# Patient Record
Sex: MALE | Race: WHITE | URBAN - METROPOLITAN AREA
[De-identification: names, ages, dates, MRNs, and addresses within clinical notes are randomized per-mention and may not be internally consistent; named-entity substitution may affect disease eponyms.]

---

## 2020-05-13 PROBLEM — M79.89 SWELLING OF RIGHT HAND: Status: ACTIVE | Noted: 2020-05-13

## 2023-01-14 ENCOUNTER — APPOINTMENT (OUTPATIENT)
Dept: GENERAL RADIOLOGY | Age: 68
End: 2023-01-14
Attending: EMERGENCY MEDICINE
Payer: COMMERCIAL

## 2023-01-14 ENCOUNTER — HOSPITAL ENCOUNTER (INPATIENT)
Facility: HOSPITAL | Age: 68
LOS: 1 days | Discharge: HOME | End: 2023-01-15
Attending: EMERGENCY MEDICINE | Admitting: HOSPITALIST
Payer: COMMERCIAL

## 2023-01-14 ENCOUNTER — HOSPITAL ENCOUNTER (OUTPATIENT)
Facility: HOSPITAL | Age: 68
Setting detail: OBSERVATION
Discharge: HOME | End: 2023-01-15
Attending: EMERGENCY MEDICINE | Admitting: HOSPITALIST
Payer: COMMERCIAL

## 2023-01-14 ENCOUNTER — APPOINTMENT (OUTPATIENT)
Dept: ULTRASOUND IMAGING | Age: 68
End: 2023-01-14
Attending: EMERGENCY MEDICINE
Payer: COMMERCIAL

## 2023-01-14 DIAGNOSIS — I82.412 ACUTE DEEP VEIN THROMBOSIS (DVT) OF FEMORAL VEIN OF LEFT LOWER EXTREMITY (HCC): Primary | ICD-10-CM

## 2023-01-14 LAB
ALBUMIN SERPL-MCNC: 3.7 G/DL (ref 3.4–5)
ALBUMIN/GLOB SERPL: 1.1 {RATIO} (ref 1–2)
ALP LIVER SERPL-CCNC: 102 U/L
ALT SERPL-CCNC: 23 U/L
ANION GAP SERPL CALC-SCNC: 4 MMOL/L (ref 0–18)
APTT PPP: 25.1 SECONDS (ref 23.3–35.6)
AST SERPL-CCNC: 19 U/L (ref 15–37)
BASOPHILS # BLD AUTO: 0.04 X10(3) UL (ref 0–0.2)
BASOPHILS NFR BLD AUTO: 0.5 %
BILIRUB SERPL-MCNC: 0.7 MG/DL (ref 0.1–2)
BUN BLD-MCNC: 15 MG/DL (ref 7–18)
CALCIUM BLD-MCNC: 9.1 MG/DL (ref 8.5–10.1)
CHLORIDE SERPL-SCNC: 104 MMOL/L (ref 98–112)
CO2 SERPL-SCNC: 29 MMOL/L (ref 21–32)
CREAT BLD-MCNC: 0.86 MG/DL
EOSINOPHIL # BLD AUTO: 0.05 X10(3) UL (ref 0–0.7)
EOSINOPHIL NFR BLD AUTO: 0.6 %
ERYTHROCYTE [DISTWIDTH] IN BLOOD BY AUTOMATED COUNT: 11.3 %
GFR SERPLBLD BASED ON 1.73 SQ M-ARVRAT: 95 ML/MIN/1.73M2 (ref 60–?)
GLOBULIN PLAS-MCNC: 3.4 G/DL (ref 2.8–4.4)
GLUCOSE BLD-MCNC: 125 MG/DL (ref 70–99)
HCT VFR BLD AUTO: 42.3 %
HGB BLD-MCNC: 15.4 G/DL
IMM GRANULOCYTES # BLD AUTO: 0.03 X10(3) UL (ref 0–1)
IMM GRANULOCYTES NFR BLD: 0.4 %
INR BLD: 1.02 (ref 0.85–1.16)
LYMPHOCYTES # BLD AUTO: 1.15 X10(3) UL (ref 1–4)
LYMPHOCYTES NFR BLD AUTO: 13.5 %
MCH RBC QN AUTO: 34 PG (ref 26–34)
MCHC RBC AUTO-ENTMCNC: 36.4 G/DL (ref 31–37)
MCV RBC AUTO: 93.4 FL
MONOCYTES # BLD AUTO: 1.14 X10(3) UL (ref 0.1–1)
MONOCYTES NFR BLD AUTO: 13.3 %
NEUTROPHILS # BLD AUTO: 6.14 X10 (3) UL (ref 1.5–7.7)
NEUTROPHILS # BLD AUTO: 6.14 X10(3) UL (ref 1.5–7.7)
NEUTROPHILS NFR BLD AUTO: 71.7 %
NT-PROBNP SERPL-MCNC: 86 PG/ML (ref ?–125)
OSMOLALITY SERPL CALC.SUM OF ELEC: 286 MOSM/KG (ref 275–295)
PLATELET # BLD AUTO: 182 10(3)UL (ref 150–450)
POTASSIUM SERPL-SCNC: 3.9 MMOL/L (ref 3.5–5.1)
PROT SERPL-MCNC: 7.1 G/DL (ref 6.4–8.2)
PROTHROMBIN TIME: 13.4 SECONDS (ref 11.6–14.8)
RBC # BLD AUTO: 4.53 X10(6)UL
SARS-COV-2 RNA RESP QL NAA+PROBE: NOT DETECTED
SODIUM SERPL-SCNC: 137 MMOL/L (ref 136–145)
WBC # BLD AUTO: 8.6 X10(3) UL (ref 4–11)

## 2023-01-14 PROCEDURE — 99223 1ST HOSP IP/OBS HIGH 75: CPT | Performed by: INTERNAL MEDICINE

## 2023-01-14 PROCEDURE — 71046 X-RAY EXAM CHEST 2 VIEWS: CPT | Performed by: EMERGENCY MEDICINE

## 2023-01-14 PROCEDURE — 93971 EXTREMITY STUDY: CPT | Performed by: EMERGENCY MEDICINE

## 2023-01-14 RX ORDER — METOCLOPRAMIDE HYDROCHLORIDE 5 MG/ML
10 INJECTION INTRAMUSCULAR; INTRAVENOUS EVERY 8 HOURS PRN
Status: DISCONTINUED | OUTPATIENT
Start: 2023-01-14 | End: 2023-01-15

## 2023-01-14 RX ORDER — HEPARIN SODIUM 1000 [USP'U]/ML
80 INJECTION, SOLUTION INTRAVENOUS; SUBCUTANEOUS ONCE
Status: COMPLETED | OUTPATIENT
Start: 2023-01-14 | End: 2023-01-14

## 2023-01-14 RX ORDER — SODIUM PHOSPHATE, DIBASIC AND SODIUM PHOSPHATE, MONOBASIC 7; 19 G/133ML; G/133ML
1 ENEMA RECTAL ONCE AS NEEDED
Status: DISCONTINUED | OUTPATIENT
Start: 2023-01-14 | End: 2023-01-15

## 2023-01-14 RX ORDER — BISACODYL 10 MG
10 SUPPOSITORY, RECTAL RECTAL
Status: DISCONTINUED | OUTPATIENT
Start: 2023-01-14 | End: 2023-01-15

## 2023-01-14 RX ORDER — SENNOSIDES 8.6 MG
17.2 TABLET ORAL NIGHTLY PRN
Status: DISCONTINUED | OUTPATIENT
Start: 2023-01-14 | End: 2023-01-15

## 2023-01-14 RX ORDER — HEPARIN SODIUM AND DEXTROSE 10000; 5 [USP'U]/100ML; G/100ML
INJECTION INTRAVENOUS CONTINUOUS
Status: DISCONTINUED | OUTPATIENT
Start: 2023-01-15 | End: 2023-01-15

## 2023-01-14 RX ORDER — ACETAMINOPHEN 500 MG
500 TABLET ORAL EVERY 4 HOURS PRN
Status: DISCONTINUED | OUTPATIENT
Start: 2023-01-14 | End: 2023-01-15

## 2023-01-14 RX ORDER — ONDANSETRON 2 MG/ML
4 INJECTION INTRAMUSCULAR; INTRAVENOUS EVERY 6 HOURS PRN
Status: DISCONTINUED | OUTPATIENT
Start: 2023-01-14 | End: 2023-01-15

## 2023-01-14 RX ORDER — POLYETHYLENE GLYCOL 3350 17 G/17G
17 POWDER, FOR SOLUTION ORAL DAILY PRN
Status: DISCONTINUED | OUTPATIENT
Start: 2023-01-14 | End: 2023-01-15

## 2023-01-14 RX ORDER — HEPARIN SODIUM AND DEXTROSE 10000; 5 [USP'U]/100ML; G/100ML
18 INJECTION INTRAVENOUS ONCE
Status: COMPLETED | OUTPATIENT
Start: 2023-01-14 | End: 2023-01-15

## 2023-01-14 RX ORDER — MELATONIN
3 NIGHTLY PRN
Status: DISCONTINUED | OUTPATIENT
Start: 2023-01-14 | End: 2023-01-15

## 2023-01-15 ENCOUNTER — APPOINTMENT (OUTPATIENT)
Dept: ULTRASOUND IMAGING | Facility: HOSPITAL | Age: 68
End: 2023-01-15
Attending: INTERNAL MEDICINE
Payer: COMMERCIAL

## 2023-01-15 VITALS
BODY MASS INDEX: 36.58 KG/M2 | SYSTOLIC BLOOD PRESSURE: 132 MMHG | OXYGEN SATURATION: 98 % | TEMPERATURE: 98 F | RESPIRATION RATE: 19 BRPM | HEIGHT: 73 IN | DIASTOLIC BLOOD PRESSURE: 79 MMHG | WEIGHT: 276 LBS | HEART RATE: 68 BPM

## 2023-01-15 LAB
ANION GAP SERPL CALC-SCNC: 5 MMOL/L (ref 0–18)
APTT PPP: 72.9 SECONDS (ref 23.3–35.6)
ATRIAL RATE: 85 BPM
BUN BLD-MCNC: 14 MG/DL (ref 7–18)
CALCIUM BLD-MCNC: 8.5 MG/DL (ref 8.5–10.1)
CHLORIDE SERPL-SCNC: 104 MMOL/L (ref 98–112)
CO2 SERPL-SCNC: 29 MMOL/L (ref 21–32)
CREAT BLD-MCNC: 0.81 MG/DL
ERYTHROCYTE [DISTWIDTH] IN BLOOD BY AUTOMATED COUNT: 11.2 %
GFR SERPLBLD BASED ON 1.73 SQ M-ARVRAT: 97 ML/MIN/1.73M2 (ref 60–?)
GLUCOSE BLD-MCNC: 126 MG/DL (ref 70–99)
HCT VFR BLD AUTO: 39.9 %
HGB BLD-MCNC: 14.1 G/DL
MCH RBC QN AUTO: 34.1 PG (ref 26–34)
MCHC RBC AUTO-ENTMCNC: 35.3 G/DL (ref 31–37)
MCV RBC AUTO: 96.6 FL
OSMOLALITY SERPL CALC.SUM OF ELEC: 288 MOSM/KG (ref 275–295)
P AXIS: 60 DEGREES
P-R INTERVAL: 180 MS
PLATELET # BLD AUTO: 170 10(3)UL (ref 150–450)
POTASSIUM SERPL-SCNC: 4.2 MMOL/L (ref 3.5–5.1)
Q-T INTERVAL: 364 MS
QRS DURATION: 90 MS
QTC CALCULATION (BEZET): 433 MS
R AXIS: 49 DEGREES
RBC # BLD AUTO: 4.13 X10(6)UL
SODIUM SERPL-SCNC: 138 MMOL/L (ref 136–145)
T AXIS: 58 DEGREES
VENTRICULAR RATE: 85 BPM
WBC # BLD AUTO: 6.8 X10(3) UL (ref 4–11)

## 2023-01-15 PROCEDURE — 99239 HOSP IP/OBS DSCHRG MGMT >30: CPT | Performed by: HOSPITALIST

## 2023-01-15 PROCEDURE — 93971 EXTREMITY STUDY: CPT | Performed by: INTERNAL MEDICINE

## 2023-01-15 NOTE — ED QUICK NOTES
Orders for admission, patient is aware of plan and ready to go upstairs. Any questions, please call ED RN Tesha Madrid at extension [phone]. Patient Covid vaccination status: Fully vaccinated     COVID Test Ordered in ED: Rapid SARS-CoV-2 by PCR    COVID Suspicion at Admission: N/A    Running Infusions:      Mental Status/LOC at time of transport: Alert and Oriented x 3    Other pertinent information:   CIWA score: N/A   NIH score:  N/A     Patient has a heparin drip infusion at 2,200 units/hr at 22ml/hr.

## 2023-01-15 NOTE — CM/SW NOTE
Informed by RN that patient will be starting Eliquis at discharge. SW provided RN with 30 day free coupon and $10 month coupon to give to patient. SW will remain available.     MAMADOU Kong  Discharge Planner

## 2023-01-15 NOTE — PLAN OF CARE
Assumed pt care at 0730. A&Ox4. VSS. Room air. NSR on tele. LLE with edema, heat, & redness - pt reports improving. Reports tingling in bilateral feet, US doppler RLE this AM. L AC PIV infusing heparin gtt @ 22 mL/hr. PTT therapeutic per DVT/PE protocol, redraw ordered for 1/16 AM. Denies pain, denies N/V. Cardiac diet. Voiding, up ad rafael to bathroom. Pt updated with POC.      Problem: Patient/Family Goals  Goal: Patient/Family Long Term Goal  Description: Patient's Long Term Goal: discharge  Interventions:  - heparin gtt  - US RLE  - See additional Care Plan goals for specific interventions  Outcome: Progressing  Goal: Patient/Family Short Term Goal  Description: Patient's Short Term Goal: reduce swelling & tingling to legs  Interventions:   - heparin gtt  - US RLE  - See additional Care Plan goals for specific interventions  Outcome: Progressing

## 2023-01-15 NOTE — PROGRESS NOTES
Admitted into unit form [facility] LLE DVT  A & O x 4  On R/A  NSR on tele. Denies Pain. Redness and swelling to LLE  On heparin gtt at 22 units/hr  APTT at 0445  Able to ambulate to the bathroom independently. Oriented to room and call light. Needs met. Will continue to monitor.

## 2023-01-15 NOTE — PLAN OF CARE
NURSING DISCHARGE NOTE    Discharged Home via Ambulatory. Accompanied by Family member  Belongings Taken by patient/family. Pt discharged in calm, stable status to home. PIV removed. Discharge paperwork provided & discussed, pt verbalized understanding. Paper prescription for eliquis provided. 2 coupons provided to pt for eliquis, instructed on use. Pt's primary [facility] is out of eliquis - [facility] that [facility] sufficient stock of eliquis to fill pt's prescription. Pharmacy information provided to pt.

## 2023-01-15 NOTE — ED QUICK NOTES
Patient updated with plan of care. Patient denies any questions or concerns. Awaiting room assignment.

## 2023-01-16 ENCOUNTER — PATIENT OUTREACH (OUTPATIENT)
Dept: CASE MANAGEMENT | Age: 68
End: 2023-01-16

## 2023-01-17 NOTE — PROGRESS NOTES
LM for pt to call [facility] for TCM since discharge. NCM phone number was provided for pt to call back. TCC contact information was provided for pt to call back as well.

## 2023-02-15 ENCOUNTER — TELEPHONE (OUTPATIENT)
Dept: INTERNAL MEDICINE CLINIC | Facility: CLINIC | Age: 68
End: 2023-02-15

## 2023-02-15 NOTE — TELEPHONE ENCOUNTER
Received call from Wicho Grubbs NP, pt called Dr Jordan Prado needing refill for eliquis but has not established care with PCP. Called pt and informed him that Dr Jordan Prado sent in a 2 week supply but pt will need to establish with PCP for further refills. Pt lives in [facility] [facility] and works on the [employer], states he has a 2 hour commute and is very difficult to get in to see providers. Scheduled appt with Dr Burton Edmonds in [place] for Saturday March 11 which pt was very grateful for. Advised pt the eliquis refill will not last until then and he needs to be seen here at the [facility].  Pt stated he would check his schedule and call us back to schedule with the TCC

## 2023-02-21 ENCOUNTER — OFFICE VISIT (OUTPATIENT)
Dept: FAMILY MEDICINE CLINIC | Facility: CLINIC | Age: 68
End: 2023-02-21
Payer: COMMERCIAL

## 2023-02-21 ENCOUNTER — TELEPHONE (OUTPATIENT)
Dept: FAMILY MEDICINE CLINIC | Facility: CLINIC | Age: 68
End: 2023-02-21

## 2023-02-21 VITALS
WEIGHT: 286.38 LBS | SYSTOLIC BLOOD PRESSURE: 130 MMHG | RESPIRATION RATE: 18 BRPM | HEART RATE: 74 BPM | HEIGHT: 73 IN | OXYGEN SATURATION: 100 % | DIASTOLIC BLOOD PRESSURE: 64 MMHG | BODY MASS INDEX: 37.95 KG/M2 | TEMPERATURE: 97 F

## 2023-02-21 DIAGNOSIS — I82.412 ACUTE DEEP VEIN THROMBOSIS (DVT) OF FEMORAL VEIN OF LEFT LOWER EXTREMITY (HCC): Primary | ICD-10-CM

## 2023-02-21 PROCEDURE — 3078F DIAST BP <80 MM HG: CPT | Performed by: FAMILY MEDICINE

## 2023-02-21 PROCEDURE — 3008F BODY MASS INDEX DOCD: CPT | Performed by: FAMILY MEDICINE

## 2023-02-21 PROCEDURE — 1111F DSCHRG MED/CURRENT MED MERGE: CPT | Performed by: FAMILY MEDICINE

## 2023-02-21 PROCEDURE — 3075F SYST BP GE 130 - 139MM HG: CPT | Performed by: FAMILY MEDICINE

## 2023-02-21 PROCEDURE — 99204 OFFICE O/P NEW MOD 45 MIN: CPT | Performed by: FAMILY MEDICINE

## 2023-02-21 NOTE — PATIENT INSTRUCTIONS
Continue your Eliquis 5 mg one tablet twice daily for another 2 months. Elevate your leg. Make an appointment with the hematologist, Dr Nirmala Berry or one of his partners, for recheck on your blood clot. Go to the ER if you develop chest pain, shortness of breath, dizziness, fever, chills, worsening redness or swelling to the left leg. See me in 4 weeks for your annual physical and recheck on your blood clot.

## 2023-02-21 NOTE — TELEPHONE ENCOUNTER
Approved    Prior authorization approved Case ID: [id number]      Payer:  [address]   [phone]  Morrison Nahomy    HDISIH:[id number];CGBKBY:[id number]; Review Type:Prior Auth; Coverage Start Date:01/22/2023; Coverage End Date:02/21/2024;    Approval Details    Authorized from January 22, 2023 to February 21, 2024      Electronic appeal:  Not supported

## 2023-02-22 ENCOUNTER — TELEPHONE (OUTPATIENT)
Dept: FAMILY MEDICINE CLINIC | Facility: CLINIC | Age: 68
End: 2023-02-22

## 2023-02-22 NOTE — TELEPHONE ENCOUNTER
Patient calling states seen yesterday and 2 rx's were sent to his pharmacy still waiting for approval     Patient states has been out of these medications

## 2023-03-03 ENCOUNTER — TELEPHONE (OUTPATIENT)
Dept: HEMATOLOGY/ONCOLOGY | Facility: HOSPITAL | Age: 68
End: 2023-03-03

## 2023-03-03 NOTE — TELEPHONE ENCOUNTER
Patient calling to schedule consult with Dr Marvin Case    Referring Provider: Bryan Chi Referred To Provider: Nick Rivera   Diagnosis: I82.412 (ICD-10-CM) - 453.41 (ICD-9-CM) - Acute deep vein thrombosis (DVT) of femoral vein of left lower extremity [facility])   referral is authorized    plz call his WORK NUMBER    [phone]

## 2023-03-16 ENCOUNTER — OFFICE VISIT (OUTPATIENT)
Dept: HEMATOLOGY/ONCOLOGY | Age: 68
End: 2023-03-16
Attending: INTERNAL MEDICINE
Payer: COMMERCIAL

## 2023-03-16 VITALS
TEMPERATURE: 97 F | OXYGEN SATURATION: 96 % | SYSTOLIC BLOOD PRESSURE: 190 MMHG | HEART RATE: 83 BPM | DIASTOLIC BLOOD PRESSURE: 89 MMHG | RESPIRATION RATE: 20 BRPM | BODY MASS INDEX: 38 KG/M2 | WEIGHT: 286.5 LBS

## 2023-03-16 DIAGNOSIS — I82.412 ACUTE DEEP VEIN THROMBOSIS (DVT) OF FEMORAL VEIN OF LEFT LOWER EXTREMITY (HCC): Primary | ICD-10-CM

## 2023-03-16 DIAGNOSIS — M25.561 CHRONIC PAIN OF BOTH KNEES: ICD-10-CM

## 2023-03-16 DIAGNOSIS — Z79.01 CURRENT USE OF LONG TERM ANTICOAGULATION: ICD-10-CM

## 2023-03-16 DIAGNOSIS — G89.29 CHRONIC PAIN OF BOTH KNEES: ICD-10-CM

## 2023-03-16 DIAGNOSIS — M25.562 CHRONIC PAIN OF BOTH KNEES: ICD-10-CM

## 2023-03-16 PROCEDURE — 99244 OFF/OP CNSLTJ NEW/EST MOD 40: CPT | Performed by: INTERNAL MEDICINE

## 2023-03-16 NOTE — PROGRESS NOTES
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 Less swelling, but not normal yet. Taking eliquis, is affordable for him.

## 2023-03-20 ENCOUNTER — OFFICE VISIT (OUTPATIENT)
Dept: FAMILY MEDICINE CLINIC | Facility: CLINIC | Age: 68
End: 2023-03-20
Payer: COMMERCIAL

## 2023-03-20 VITALS
WEIGHT: 284 LBS | RESPIRATION RATE: 18 BRPM | HEART RATE: 86 BPM | SYSTOLIC BLOOD PRESSURE: 160 MMHG | OXYGEN SATURATION: 98 % | TEMPERATURE: 97 F | DIASTOLIC BLOOD PRESSURE: 84 MMHG | HEIGHT: 73 IN | BODY MASS INDEX: 37.64 KG/M2

## 2023-03-20 DIAGNOSIS — Z28.21 IMMUNIZATION DECLINED: ICD-10-CM

## 2023-03-20 DIAGNOSIS — Z12.5 SCREENING FOR PROSTATE CANCER: ICD-10-CM

## 2023-03-20 DIAGNOSIS — Z12.11 SCREENING FOR COLON CANCER: ICD-10-CM

## 2023-03-20 DIAGNOSIS — I10 PRIMARY HYPERTENSION: ICD-10-CM

## 2023-03-20 DIAGNOSIS — R73.9 HYPERGLYCEMIA: ICD-10-CM

## 2023-03-20 DIAGNOSIS — E66.01 CLASS 2 SEVERE OBESITY DUE TO EXCESS CALORIES WITH SERIOUS COMORBIDITY AND BODY MASS INDEX (BMI) OF 37.0 TO 37.9 IN ADULT (HCC): ICD-10-CM

## 2023-03-20 DIAGNOSIS — Z00.00 WELLNESS EXAMINATION: Primary | ICD-10-CM

## 2023-03-20 DIAGNOSIS — I82.412 ACUTE DEEP VEIN THROMBOSIS (DVT) OF FEMORAL VEIN OF LEFT LOWER EXTREMITY (HCC): ICD-10-CM

## 2023-03-20 DIAGNOSIS — Z00.00 LABORATORY EXAM ORDERED AS PART OF ROUTINE GENERAL MEDICAL EXAMINATION: ICD-10-CM

## 2023-04-09 ENCOUNTER — APPOINTMENT (OUTPATIENT)
Dept: LAB | Age: 68
End: 2023-04-09
Attending: FAMILY MEDICINE
Payer: COMMERCIAL

## 2023-04-13 ENCOUNTER — TELEPHONE (OUTPATIENT)
Dept: HEMATOLOGY/ONCOLOGY | Age: 68
End: 2023-04-13

## 2023-04-13 ENCOUNTER — APPOINTMENT (OUTPATIENT)
Dept: HEMATOLOGY/ONCOLOGY | Age: 68
End: 2023-04-13
Attending: INTERNAL MEDICINE
Payer: COMMERCIAL

## 2023-05-02 ENCOUNTER — HOSPITAL ENCOUNTER (OUTPATIENT)
Dept: ULTRASOUND IMAGING | Age: 68
Discharge: HOME | End: 2023-05-02
Attending: INTERNAL MEDICINE
Payer: COMMERCIAL

## 2023-05-02 DIAGNOSIS — I82.412 ACUTE DEEP VEIN THROMBOSIS (DVT) OF FEMORAL VEIN OF LEFT LOWER EXTREMITY (HCC): ICD-10-CM

## 2023-05-02 PROCEDURE — 93971 EXTREMITY STUDY: CPT | Performed by: INTERNAL MEDICINE

## 2023-05-03 ENCOUNTER — TELEPHONE (OUTPATIENT)
Dept: HEMATOLOGY/ONCOLOGY | Age: 68
End: 2023-05-03

## 2023-05-03 NOTE — TELEPHONE ENCOUNTER
----- Message from Neha Bolanos MD sent at 5/3/2023  2:18 PM CDT -----  Regarding: follow up appt  Can you please contact him to schedule a follow up tomorrow at ? He canceled his last appt because venous doppler was not done, but had it done yesterday.    Thank you

## 2023-05-04 ENCOUNTER — TELEPHONE (OUTPATIENT)
Dept: HEMATOLOGY/ONCOLOGY | Facility: HOSPITAL | Age: 68
End: 2023-05-04

## 2023-05-04 NOTE — TELEPHONE ENCOUNTER
Left patient a voicemail message asking him to follow up with Dr Ely Ramos. He recently had a venous doppler and needs follow up to discuss POC.

## 2023-05-23 ENCOUNTER — TELEPHONE (OUTPATIENT)
Dept: HEMATOLOGY/ONCOLOGY | Age: 68
End: 2023-05-23

## 2023-05-23 NOTE — TELEPHONE ENCOUNTER
----- Message from Regan Delcid MD sent at 5/23/2023  2:33 PM CDT -----  Regarding: appt  Can you please check if he can earlier on Thursday like 3:15 pr 3:30? I have to leave at 4:00 pm for an appointment.  Thank you

## 2023-05-25 ENCOUNTER — OFFICE VISIT (OUTPATIENT)
Dept: HEMATOLOGY/ONCOLOGY | Age: 68
End: 2023-05-25
Attending: INTERNAL MEDICINE
Payer: COMMERCIAL

## 2023-05-25 VITALS
DIASTOLIC BLOOD PRESSURE: 86 MMHG | RESPIRATION RATE: 20 BRPM | TEMPERATURE: 98 F | BODY MASS INDEX: 39 KG/M2 | SYSTOLIC BLOOD PRESSURE: 178 MMHG | WEIGHT: 294.5 LBS | HEART RATE: 81 BPM | OXYGEN SATURATION: 97 %

## 2023-05-25 DIAGNOSIS — M25.562 CHRONIC PAIN OF BOTH KNEES: ICD-10-CM

## 2023-05-25 DIAGNOSIS — I82.412 ACUTE DEEP VEIN THROMBOSIS (DVT) OF FEMORAL VEIN OF LEFT LOWER EXTREMITY (HCC): Primary | ICD-10-CM

## 2023-05-25 DIAGNOSIS — M25.561 CHRONIC PAIN OF BOTH KNEES: ICD-10-CM

## 2023-05-25 DIAGNOSIS — Z79.01 CURRENT USE OF LONG TERM ANTICOAGULATION: ICD-10-CM

## 2023-05-25 DIAGNOSIS — G89.29 CHRONIC PAIN OF BOTH KNEES: ICD-10-CM

## 2023-05-25 PROCEDURE — 99213 OFFICE O/P EST LOW 20 MIN: CPT | Performed by: INTERNAL MEDICINE

## 2023-05-25 NOTE — PROGRESS NOTES
Patient is here for 2 month follow up for DVT. Patient continues on Eliquis twice daily. He had an ultrasound on 5/2. Here to go over the results. Denies cough or SOB.        Education Record    Learner:  Patient    Disease / Diagnosis:  DVT     Barriers / Limitations:  None   Comments:    Method:  Discussion   Comments:    General Topics:  Plan of care reviewed   Comments:    Outcome:  Shows understanding   Comments:

## 2023-08-08 ENCOUNTER — TELEPHONE (OUTPATIENT)
Dept: HEMATOLOGY/ONCOLOGY | Facility: HOSPITAL | Age: 68
End: 2023-08-08

## 2023-08-08 ENCOUNTER — OFFICE VISIT (OUTPATIENT)
Dept: HEMATOLOGY/ONCOLOGY | Facility: HOSPITAL | Age: 68
End: 2023-08-08
Attending: INTERNAL MEDICINE
Payer: COMMERCIAL

## 2023-08-08 VITALS
OXYGEN SATURATION: 94 % | BODY MASS INDEX: 39 KG/M2 | DIASTOLIC BLOOD PRESSURE: 85 MMHG | TEMPERATURE: 98 F | WEIGHT: 295.81 LBS | HEART RATE: 89 BPM | SYSTOLIC BLOOD PRESSURE: 174 MMHG | RESPIRATION RATE: 18 BRPM

## 2023-08-08 DIAGNOSIS — I82.412 ACUTE DEEP VEIN THROMBOSIS (DVT) OF FEMORAL VEIN OF LEFT LOWER EXTREMITY (HCC): Primary | ICD-10-CM

## 2023-08-08 DIAGNOSIS — G89.29 CHRONIC PAIN OF BOTH KNEES: ICD-10-CM

## 2023-08-08 DIAGNOSIS — Z79.01 CURRENT USE OF LONG TERM ANTICOAGULATION: ICD-10-CM

## 2023-08-08 DIAGNOSIS — I87.002 POST-THROMBOTIC SYNDROME OF LEFT LOWER EXTREMITY: ICD-10-CM

## 2023-08-08 DIAGNOSIS — M25.561 CHRONIC PAIN OF BOTH KNEES: ICD-10-CM

## 2023-08-08 DIAGNOSIS — M25.562 CHRONIC PAIN OF BOTH KNEES: ICD-10-CM

## 2023-08-08 LAB
ANION GAP SERPL CALC-SCNC: 5 MMOL/L (ref 0–18)
BASOPHILS # BLD AUTO: 0.04 X10(3) UL (ref 0–0.2)
BASOPHILS NFR BLD AUTO: 0.6 %
BUN BLD-MCNC: 17 MG/DL (ref 7–18)
CALCIUM BLD-MCNC: 9.1 MG/DL (ref 8.5–10.1)
CHLORIDE SERPL-SCNC: 109 MMOL/L (ref 98–112)
CO2 SERPL-SCNC: 24 MMOL/L (ref 21–32)
CREAT BLD-MCNC: 0.9 MG/DL
EGFRCR SERPLBLD CKD-EPI 2021: 93 ML/MIN/1.73M2 (ref 60–?)
EOSINOPHIL # BLD AUTO: 0.06 X10(3) UL (ref 0–0.7)
EOSINOPHIL NFR BLD AUTO: 0.8 %
ERYTHROCYTE [DISTWIDTH] IN BLOOD BY AUTOMATED COUNT: 11.5 %
FASTING STATUS PATIENT QL REPORTED: NO
GLUCOSE BLD-MCNC: 118 MG/DL (ref 70–99)
HCT VFR BLD AUTO: 44.2 %
HGB BLD-MCNC: 15.5 G/DL
IMM GRANULOCYTES # BLD AUTO: 0.04 X10(3) UL (ref 0–1)
IMM GRANULOCYTES NFR BLD: 0.6 %
LYMPHOCYTES # BLD AUTO: 1.37 X10(3) UL (ref 1–4)
LYMPHOCYTES NFR BLD AUTO: 18.8 %
MCH RBC QN AUTO: 33.5 PG (ref 26–34)
MCHC RBC AUTO-ENTMCNC: 35.1 G/DL (ref 31–37)
MCV RBC AUTO: 95.7 FL
MONOCYTES # BLD AUTO: 0.7 X10(3) UL (ref 0.1–1)
MONOCYTES NFR BLD AUTO: 9.6 %
NEUTROPHILS # BLD AUTO: 5.06 X10 (3) UL (ref 1.5–7.7)
NEUTROPHILS # BLD AUTO: 5.06 X10(3) UL (ref 1.5–7.7)
NEUTROPHILS NFR BLD AUTO: 69.6 %
OSMOLALITY SERPL CALC.SUM OF ELEC: 289 MOSM/KG (ref 275–295)
PLATELET # BLD AUTO: 185 10(3)UL (ref 150–450)
POTASSIUM SERPL-SCNC: 4.2 MMOL/L (ref 3.5–5.1)
RBC # BLD AUTO: 4.62 X10(6)UL
SODIUM SERPL-SCNC: 138 MMOL/L (ref 136–145)
WBC # BLD AUTO: 7.3 X10(3) UL (ref 4–11)

## 2023-08-08 PROCEDURE — 99214 OFFICE O/P EST MOD 30 MIN: CPT | Performed by: INTERNAL MEDICINE

## 2023-08-08 NOTE — PROGRESS NOTES
Patient here for 3 month f/u for DVT. Patient states he is still experiencing left leg edema. Patient denies pain. Patient has no other concerns or complaints at this time.     Education Record    Learner:  Patient    Disease / Diagnosis: LLE DVT    Barriers / Limitations:  None   Comments:    Method:  Discussion   Comments:    General Topics:  Plan of care reviewed   Comments:    Outcome:  Shows understanding   Comments:

## 2023-08-20 ENCOUNTER — HOSPITAL ENCOUNTER (OUTPATIENT)
Dept: ULTRASOUND IMAGING | Age: 68
End: 2023-08-20
Attending: INTERNAL MEDICINE
Payer: COMMERCIAL

## 2023-08-20 ENCOUNTER — HOSPITAL ENCOUNTER (OUTPATIENT)
Dept: ULTRASOUND IMAGING | Age: 68
Discharge: HOME | End: 2023-08-20
Attending: INTERNAL MEDICINE
Payer: COMMERCIAL

## 2023-08-20 DIAGNOSIS — I82.412 ACUTE DEEP VEIN THROMBOSIS (DVT) OF FEMORAL VEIN OF LEFT LOWER EXTREMITY (HCC): ICD-10-CM

## 2023-08-20 PROCEDURE — 93971 EXTREMITY STUDY: CPT | Performed by: INTERNAL MEDICINE

## 2023-08-30 DIAGNOSIS — I87.002 POST-THROMBOTIC SYNDROME OF LEFT LOWER EXTREMITY: ICD-10-CM

## 2023-08-30 DIAGNOSIS — I82.412 ACUTE DEEP VEIN THROMBOSIS (DVT) OF FEMORAL VEIN OF LEFT LOWER EXTREMITY (HCC): Primary | ICD-10-CM

## 2023-11-25 DIAGNOSIS — I82.412 ACUTE DEEP VEIN THROMBOSIS (DVT) OF FEMORAL VEIN OF LEFT LOWER EXTREMITY (HCC): ICD-10-CM

## 2024-01-18 ENCOUNTER — TELEPHONE (OUTPATIENT)
Dept: HEMATOLOGY/ONCOLOGY | Age: 69
End: 2024-01-18

## 2024-01-18 NOTE — TELEPHONE ENCOUNTER
----- Message from Samantha Reyez RN sent at 1/17/2024  1:58 PM CST -----  Regarding: FW: referral  Contact: [phone]  Can someone please schedule this patient at [place] with another provider does not want to come to [place] to see Dr. South.   Due for follow up around 2/8/24- DVT    Thank you Samantha  ----- Message -----  From: Yoni Salas  Sent: 1/17/2024   1:42 PM CST  To: Alicia South Rns  Subject: referral                                         I wont be able to follow the  to [place] could she make a referral for the [place] office                                                             Thank you

## 2024-01-25 ENCOUNTER — TELEPHONE (OUTPATIENT)
Dept: HEMATOLOGY/ONCOLOGY | Facility: HOSPITAL | Age: 69
End: 2024-01-25

## 2024-01-25 DIAGNOSIS — I82.412 ACUTE DEEP VEIN THROMBOSIS (DVT) OF FEMORAL VEIN OF LEFT LOWER EXTREMITY (HCC): ICD-10-CM

## 2024-01-25 NOTE — TELEPHONE ENCOUNTER
----- Message from Sharon Biswas RN sent at 1/25/2024 11:55 AM CST -----  Regarding: RE: F/U appt  Yes, he sent a mychart message this morning stating that no one has called him to set him up at [facility]. Do you mind calling him again today?    Dominique  ----- Message -----  From: Dee Palomares  Sent: 1/25/2024  11:51 AM CST  To: Sharon Biswas RN  Subject: RE: F/U appt                                     Pt was called last week, did he reach out to [place]?    ----- Message -----  From: Sharon Biswas RN  Sent: 1/25/2024  11:49 AM CST  To: [facility] Zaldivar Ps[facility]  Subject: F/U appt                                         Patient of Dr. South that does not want to follow her to [place].     Can someone please schedule this patient at [place] with another provider?  Due for follow up around 2/8/24- DVT    ThanksDominique  [id number]

## 2024-02-07 ENCOUNTER — TELEPHONE (OUTPATIENT)
Dept: FAMILY MEDICINE CLINIC | Facility: CLINIC | Age: 69
End: 2024-02-07

## 2024-02-13 ENCOUNTER — TELEPHONE (OUTPATIENT)
Dept: FAMILY MEDICINE CLINIC | Facility: CLINIC | Age: 69
End: 2024-02-13

## 2024-02-13 NOTE — TELEPHONE ENCOUNTER
Lower Extremity Venous Ultrasound Report (Reflux) - DOS 1/30/2024    Final impressions:    1.  The study quality is average.  Left leg deep system is not visualized well due to edema.  2.  History of left leg DVT proximal femoral vein through posterior tibial veins.  3.  Exam performed with the patient in reverse Trendelenburg and sitting positions.  4.  Normal compressibility, augmentation, and phasic flow in the right lower extremity venous systems bilaterally.  Negative study for acute deep venous thrombosis of the right leg.  5.  Left leg partially compressible mid femoral vein through popliteal vein with evidence of recanalized blood flow.  Left posttibial tibial veins not visualized due to edema.  Deep reflux is noted.    Right GSV reflux  Right SSV reflux  Right perforators and branches, see report    Left GSV reflux  Left reflux and SSV distal thigh extension but no reflux in the remainder of SSV  Left perforators and branches, see report

## 2024-02-13 NOTE — TELEPHONE ENCOUNTER
Transthoracic Echocardiogram-DOS 1/30/2024    Final impressions:    1.  The study quality is good.  2.  Global left ventricular systolic function is normal.  The left ventricle diastolic function is impaired (grade 1) with normal left atrial pressure.  The wall thickness is within normal limits.  The left ventricular ejection fraction is 55 to 60%.  No regional wall motion abnormality is noted.  3.  Aortic root diameter is 4.2 cm.  Dilatation of the aortic root is noted.  4.  Right ventricular systolic function is normal.

## 2024-02-14 ENCOUNTER — MED REC SCAN ONLY (OUTPATIENT)
Dept: FAMILY MEDICINE CLINIC | Facility: CLINIC | Age: 69
End: 2024-02-14

## 2024-02-28 ENCOUNTER — TELEPHONE (OUTPATIENT)
Dept: HEMATOLOGY/ONCOLOGY | Facility: HOSPITAL | Age: 69
End: 2024-02-28

## 2024-02-28 NOTE — TELEPHONE ENCOUNTER
Called patient and LM on  regarding appointment with Dr. South for tomorrow.  Noted in chart he was interested in follow up at [place] with another provider and that he expressed [place] was too far.  Asked to call back if he wants to change appointment to [place].

## 2024-02-29 ENCOUNTER — OFFICE VISIT (OUTPATIENT)
Dept: HEMATOLOGY/ONCOLOGY | Facility: HOSPITAL | Age: 69
End: 2024-02-29
Attending: INTERNAL MEDICINE
Payer: COMMERCIAL

## 2024-02-29 VITALS
DIASTOLIC BLOOD PRESSURE: 87 MMHG | OXYGEN SATURATION: 96 % | TEMPERATURE: 99 F | WEIGHT: 299.69 LBS | RESPIRATION RATE: 18 BRPM | HEART RATE: 68 BPM | SYSTOLIC BLOOD PRESSURE: 163 MMHG | BODY MASS INDEX: 39.72 KG/M2 | HEIGHT: 73 IN

## 2024-02-29 DIAGNOSIS — I82.412 ACUTE DEEP VEIN THROMBOSIS (DVT) OF FEMORAL VEIN OF LEFT LOWER EXTREMITY (HCC): Primary | ICD-10-CM

## 2024-02-29 DIAGNOSIS — I87.002 POST-THROMBOTIC SYNDROME OF LEFT LOWER EXTREMITY: ICD-10-CM

## 2024-02-29 DIAGNOSIS — Z79.01 CURRENT USE OF LONG TERM ANTICOAGULATION: ICD-10-CM

## 2024-02-29 PROCEDURE — 99214 OFFICE O/P EST MOD 30 MIN: CPT | Performed by: INTERNAL MEDICINE

## 2024-02-29 NOTE — PROGRESS NOTES
Hematology Oncology Follow-up Note    Patient Name: Yoni Salas   YOB: 1955   Medical Record Number: [id number]   CSN: [id number]   Attending Physician: Yazmin South MD     Date of Visit: 02/29/24     Chief Complaint:  Follow up on DVT  Anticoagulation management    History of Present Illness:  Yoni Salas is 69 year old White male history of HTN, HLD, OA, extensive DVT in the left leg diagnosed in January 2023 here for 6 months follow up. He is on eliquis 5 mg bid and tolerating it well. Still with persistent left leg edema, but markedly improved since last visit. He is uriel compression stocking all day. following with cardiology and plan to continue with conservative management. Reports chronic bilateral knee pain from severe arthritis, otherwise no feet or calf pain. No skin discoloration, tingling, numbness, weakness or trouble walking. He works as a contractor and remains physically active. Noted easy bruising and prolonged bleeding if he injures himself at work, otherwise no abnormal bleeding.     Current Medications:    Current Outpatient Medications:     apixaban (ELIQUIS) 5 MG Oral Tab, Take 1 tablet (5 mg total) by mouth 2 (two) times daily., Disp: 60 tablet, Rfl: 11      Review of Systems:  10 -point systems reviewed, all negative except as mentioned in the HPI/interval history.     Vital Signs:  BP (!) 163/87 (BP Location: Left arm, Patient Position: Sitting, Cuff Size: large)   Pulse 68   Temp 98.6 °F (37 °C) (Oral)   Resp 18   Ht 1.854 m (6' 1\")   Wt 135.9 kg (299 lb 11.2 oz)   SpO2 96%   BMI 39.54 kg/m²     Physical Examination:  General: Aert and oriented x 3, not in acute distress.  Chest: Clear to auscultation.  Heart: Regular rate and rhythm. S1S2 normal.  Abdomen: Soft, non tender with good bowel sounds.    Extremities: Left leg edema up the knee. No calf tenderness. No skin discoloration. RLE with no edema.   Neurological: Grossly intact.     Laboratory:  Lab  Results   Component Value Date    WBC 7.3 08/08/2023    RBC 4.62 08/08/2023    HGB 15.5 08/08/2023    HCT 44.2 08/08/2023    MCV 95.7 08/08/2023    MCH 33.5 08/08/2023    MCHC 35.1 08/08/2023    RDW 11.5 08/08/2023    .0 08/08/2023     Lab Results   Component Value Date     08/08/2023    K 4.2 08/08/2023     08/08/2023    CO2 24.0 08/08/2023    BUN 17 08/08/2023    CREATSERUM 0.90 08/08/2023     (H) 08/08/2023    CA 9.1 08/08/2023    ALKPHO 102 01/14/2023    ALT 23 01/14/2023    AST 19 01/14/2023    BILT 0.7 01/14/2023    ALB 3.7 01/14/2023    TP 7.1 01/14/2023       Radiology:  No results found.    Lower Extremity Venous Ultrasound 1/30/2024    1.The study quality is average. Left leg deep system not visualized well due to edema.   2.History of Left leg DVT proximal femoral vein through posterior tibial veins  3.Exam performed with the patient in reverse Trendelenburg and sitting positions.   4.Normal compressibility, augmentation, and phasic flow in the Right lower extremity venous systems bilaterally. Negative study for acute deep venous thrombosis of the Right leg  5.Left leg partially compressible mid femoral vein through popliteal vein with evidence of recanalized blood flow. Left posterior tibial veins not visualized due to edema. Deep reflux noted.   7.Right GSV reflux  8.Right no SSV reflux  9.Right perforators and branches, see report  11.Left GSV reflux  12.Left reflux in SSV distal thigh extension but no reflux in remainder of SSV  13.Left perforators and branches, see report.     Impression & Plan:    DVT:  - unprovoked extensive left lower extremity occlusive DVT from mid superficial femoral vein to distal posterior tibial vein with marked edema diagnosed 1/15/23. with extensive residual/chronic non-occlusive thrombus from the proximal superficial femoral vein to the posterior tibial vein seen on repeat doppler 5/2023.   - following with cardiology (Dr. Pozo) with plan  to continue conservative management; compression stoking, exercise, weight loss.   - repeat doppler 1/2024 continue to show a partially occlusive thrombus in the mid femoral vein and popliteal vein and posterior tibial vein.  - he is on eliquis indefinitely given the extensive clot burden and unprovoked DVT.   - ok to use NSAID's as needed for severe knee arthritis; he understands the increased bleeding risk and was counseled on bleeding precautions.   - RTC 1 year      MDM- moderate: extensive review of tests and outside notes, anticoagulation management, chronic illness with side effects of tx     Yazmin South MD   Hematology Oncology   [facility]

## 2025-02-19 ENCOUNTER — OFFICE VISIT (OUTPATIENT)
Age: 70
End: 2025-02-19
Attending: INTERNAL MEDICINE
Payer: COMMERCIAL

## 2025-02-19 VITALS
HEIGHT: 73 IN | WEIGHT: 289 LBS | SYSTOLIC BLOOD PRESSURE: 155 MMHG | TEMPERATURE: 98 F | BODY MASS INDEX: 38.3 KG/M2 | OXYGEN SATURATION: 97 % | DIASTOLIC BLOOD PRESSURE: 88 MMHG | RESPIRATION RATE: 18 BRPM | HEART RATE: 79 BPM

## 2025-02-19 DIAGNOSIS — Z79.01 CURRENT USE OF LONG TERM ANTICOAGULATION: ICD-10-CM

## 2025-02-19 DIAGNOSIS — I87.002 POST-THROMBOTIC SYNDROME OF LEFT LOWER EXTREMITY: ICD-10-CM

## 2025-02-19 DIAGNOSIS — Z51.81 ANTICOAGULATION MANAGEMENT ENCOUNTER: ICD-10-CM

## 2025-02-19 DIAGNOSIS — I82.412 ACUTE DEEP VEIN THROMBOSIS (DVT) OF FEMORAL VEIN OF LEFT LOWER EXTREMITY (HCC): Primary | ICD-10-CM

## 2025-02-19 DIAGNOSIS — Z79.01 ANTICOAGULATION MANAGEMENT ENCOUNTER: ICD-10-CM

## 2025-02-19 NOTE — PROGRESS NOTES
[facility] Hematology Oncology   Follow-up Note    Patient Name: Yoni Salas   YOB: 1955   Medical Record Number: [id number]   CSN: [id number]   Attending Physician: Yazmin South MD     Date of Visit: 2/19/2025     Chief Complaint:  Follow up on DVT  Anticoagulation management    History of Present Illness:  Yoni Salas is 70 year old White male history of HTN, HLD, OA, extensive DVT in the left leg diagnosed in January 2023 here for annual follow up. He is on eliquis 5 mg bid and tolerating it well. Still with persistent left leg edema, overall stable since last visit. He is wearing compression socks daily. He is following with cardiology and plan to continue with conservative management. He reports chronic arthritis pain in the knees, well controlled with one advil 200 mg daily. No leg or calf pain. No skin discoloration, tingling, numbness, weakness or trouble walking. 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 Specifically no epistaxis, melena, hematochezia, hematuria.     Current Medications:    Current Outpatient Medications:     apixaban (ELIQUIS) 5 MG Oral Tab, Take 1 tablet (5 mg total) by mouth 2 (two) times daily., Disp: 60 tablet, Rfl: 11      Review of Systems:  10 -point systems reviewed, all negative except as mentioned in the HPI/interval history.     Vital Signs:  /88 (BP Location: Right arm, Patient Position: Sitting, Cuff Size: large)   Pulse 79   Temp 97.5 °F (36.4 °C) (Tympanic)   Resp 18   Ht 1.854 m (6' 1\")   Wt 131.1 kg (289 lb)   SpO2 97%   BMI 38.13 kg/m²     Physical Examination:  General: Aert and oriented x 3, not in acute distress.  Chest: Clear to auscultation.  Heart: Regular rate and rhythm.  Abdomen: Soft, non tender with good bowel sounds.    Extremities: Left leg edema up the knee with venous stasis. No calf tenderness. No RLE edema.  Neurological:  Grossly intact.     Laboratory:  Lab Results   Component Value Date    WBC 7.3 08/08/2023    RBC 4.62 08/08/2023    HGB 15.5 08/08/2023    HCT 44.2 08/08/2023    MCV 95.7 08/08/2023    MCH 33.5 08/08/2023    MCHC 35.1 08/08/2023    RDW 11.5 08/08/2023    .0 08/08/2023     Lab Results   Component Value Date     08/08/2023    K 4.2 08/08/2023     08/08/2023    CO2 24.0 08/08/2023    BUN 17 08/08/2023    CREATSERUM 0.90 08/08/2023     (H) 08/08/2023    CA 9.1 08/08/2023    ALKPHO 102 01/14/2023    ALT 23 01/14/2023    AST 19 01/14/2023    BILT 0.7 01/14/2023    ALB 3.7 01/14/2023    TP 7.1 01/14/2023       Radiology:  No results found.    Lower Extremity Venous Ultrasound 1/30/2024    1.The study quality is average. Left leg deep system not visualized well due to edema.   2.History of Left leg DVT proximal femoral vein through posterior tibial veins  3.Exam performed with the patient in reverse Trendelenburg and sitting positions.   4.Normal compressibility, augmentation, and phasic flow in the Right lower extremity venous systems bilaterally. Negative study for acute deep venous thrombosis of the Right leg  5.Left leg partially compressible mid femoral vein through popliteal vein with evidence of recanalized blood flow. Left posterior tibial veins not visualized due to edema. Deep reflux noted.   7.Right GSV reflux  8.Right no SSV reflux  9.Right perforators and branches, see report  11.Left GSV reflux  12.Left reflux in SSV distal thigh extension but no reflux in remainder of SSV  13.Left perforators and branches, see report.     Impression & Plan:    DVT:  - unprovoked extensive LLE occlusive DVT from mid superficial femoral vein to distal posterior tibial vein with marked edema diagnosed 1/15/23. with extensive residual/chronic non-occlusive thrombus from the proximal superficial femoral vein to the posterior tibial vein seen on repeat doppler 5/2023.   - seen by cardiology (  Nohelia) with plan to continue conservative management; compression stoking, exercise, weight loss.   - venous doppler 1/2024 showed a partially occlusive thrombus in the mid femoral vein and popliteal vein and posterior tibial vein.  - he is on eliquis indefinitely given the extensive clot burden and unprovoked DVT. No evidence of recurrent VTE. No bleeding issues.   - ok to use NSAID's as needed for severe knee arthritis; he understands the increased bleeding risk and was counseled on bleeding precautions.   - annual CBC per PCP  - RTC 1 year        Yazmin South MD  [facility] Hematology Oncology

## 2025-02-20 PROBLEM — Z79.01 ANTICOAGULATION MANAGEMENT ENCOUNTER: Status: ACTIVE | Noted: 2025-02-20

## 2025-02-20 PROBLEM — Z51.81 ANTICOAGULATION MANAGEMENT ENCOUNTER: Status: ACTIVE | Noted: 2025-02-20

## 2025-03-06 ENCOUNTER — APPOINTMENT (OUTPATIENT)
Age: 70
End: 2025-03-06
Attending: INTERNAL MEDICINE
Payer: COMMERCIAL

## 2025-03-10 DIAGNOSIS — I82.412 ACUTE DEEP VEIN THROMBOSIS (DVT) OF FEMORAL VEIN OF LEFT LOWER EXTREMITY (HCC): ICD-10-CM
